# Patient Record
Sex: FEMALE | Race: WHITE | NOT HISPANIC OR LATINO | Employment: UNEMPLOYED | URBAN - METROPOLITAN AREA
[De-identification: names, ages, dates, MRNs, and addresses within clinical notes are randomized per-mention and may not be internally consistent; named-entity substitution may affect disease eponyms.]

---

## 2023-02-05 ENCOUNTER — HOSPITAL ENCOUNTER (EMERGENCY)
Facility: HOSPITAL | Age: 23
Discharge: HOME | End: 2023-02-05
Attending: FAMILY MEDICINE

## 2023-02-05 VITALS
OXYGEN SATURATION: 100 % | BODY MASS INDEX: 27.88 KG/M2 | WEIGHT: 147.69 LBS | TEMPERATURE: 98 F | SYSTOLIC BLOOD PRESSURE: 116 MMHG | HEART RATE: 83 BPM | DIASTOLIC BLOOD PRESSURE: 59 MMHG | RESPIRATION RATE: 19 BRPM | HEIGHT: 61 IN

## 2023-02-05 DIAGNOSIS — R55 SYNCOPE, UNSPECIFIED SYNCOPE TYPE: ICD-10-CM

## 2023-02-05 DIAGNOSIS — F10.920: Primary | ICD-10-CM

## 2023-02-05 LAB
B-HCG UR QL: NEGATIVE
CTP QC/QA: YES

## 2023-02-05 PROCEDURE — 25000003 PHARM REV CODE 250: Performed by: FAMILY MEDICINE

## 2023-02-05 PROCEDURE — 81025 URINE PREGNANCY TEST: CPT | Performed by: FAMILY MEDICINE

## 2023-02-05 PROCEDURE — 96360 HYDRATION IV INFUSION INIT: CPT

## 2023-02-05 PROCEDURE — 99284 EMERGENCY DEPT VISIT MOD MDM: CPT | Mod: 25

## 2023-02-05 RX ADMIN — SODIUM CHLORIDE 1000 ML: 9 INJECTION, SOLUTION INTRAVENOUS at 02:02

## 2023-02-05 NOTE — ED PROVIDER NOTES
Encounter Date: 2/5/2023       History     Chief Complaint   Patient presents with    Alcohol Intoxication     Pt had a syncopal event after drinking alcohol at a party. Pt gcs 15, she states she feels dizzy. Boyfriend at bedside. 250cc NS given by RICCARDO      Pt presents with syncopal episode. Patient reports passing out after drinking alcohol at a party this evening.  Denies hitting her head.   Patient reports she was at a party when incident happened.  Patient reports she woke up and felt dizzy and started having some shortness of breath therefore called EMS herself to bring her to the ED for further evaluation.  Patient denies chest pain, headache, fever, chills, weakness, dizziness, abdominal pain, nausea vomiting diarrhea.    The history is provided by the patient. No  was used.   Review of patient's allergies indicates:  No Known Allergies  History reviewed. No pertinent past medical history.  History reviewed. No pertinent surgical history.  History reviewed. No pertinent family history.  Social History     Tobacco Use    Smoking status: Every Day     Types: Vaping with nicotine    Smokeless tobacco: Never   Substance Use Topics    Alcohol use: Yes    Drug use: Never     Review of Systems   Constitutional:  Negative for diaphoresis, fatigue and fever.   HENT:  Negative for sore throat.    Respiratory:  Positive for shortness of breath. Negative for wheezing and stridor.    Cardiovascular:  Negative for chest pain, palpitations and leg swelling.   Gastrointestinal:  Negative for nausea.   Genitourinary:  Negative for dysuria.   Musculoskeletal:  Negative for back pain.   Skin:  Negative for rash.   Neurological:  Positive for syncope. Negative for weakness.     Physical Exam     Initial Vitals [02/05/23 0128]   BP Pulse Resp Temp SpO2   (!) 137/91 (!) 130 17 98.1 °F (36.7 °C) 100 %      MAP       --         Physical Exam    Nursing note and vitals reviewed.  Constitutional: She appears  well-developed and well-nourished. No distress.   HENT:   Head: Normocephalic and atraumatic.   Eyes: Conjunctivae are normal.   Cardiovascular:  Normal heart sounds and intact distal pulses.           Pulmonary/Chest: Breath sounds normal.   Abdominal: Abdomen is soft. Bowel sounds are normal. There is no abdominal tenderness. There is no rebound and no guarding.   Musculoskeletal:         General: No tenderness or edema. Normal range of motion.     Neurological: She is alert and oriented to person, place, and time. Gait normal. GCS score is 15. GCS eye subscore is 4. GCS verbal subscore is 5. GCS motor subscore is 6.   Skin: Skin is warm and dry. Capillary refill takes less than 2 seconds.   Psychiatric: She has a normal mood and affect. Her behavior is normal. Judgment and thought content normal.       ED Course   Procedures  Labs Reviewed   POCT URINE PREGNANCY          Imaging Results    None          Medications   sodium chloride 0.9% bolus 1,000 mL 1,000 mL (0 mLs Intravenous Stopped 2/5/23 0330)     Medical Decision Making:   Differential Diagnosis:   [place] CT Rule    Exclusion Criteria: Age < 17 y/o; Use of Blood Thinners; Seizure After Injury    GCS < 15 at 2 hours post injury- NO   Suspected open or depress skull Fx-- NO  Signs of Basilar Skull Fx- - NO  > 2 episodes of vomiting- - NO  Age > 65 y/o-- NO  Retrograde amnesia to the event > 30 minutes- - NO  Dangerous mechanism of injury (Fall > 3 feet, Pedestrian-MVC > 5 mph; Ejection)- - NO      Any Criteria Present        No Criteria present, CT is NOT Recommended          The patient is resting comfortably and is in no acute distress.  Pt initially tachycardic however resolving.  Pt declines lab work and further imaging. Pt is clinically sober. Normal gait observed by myself and Doug Ramirez.  Parents at bedside. Patti Glover states that symptoms have improved after treatment within ER. Discussed test results, shared treatment plan, specific  conditions for return, and importance of follow up with patient and family.  The patient understands and agrees with the plan as discussed. Answered all patient questions at this time.  Patti Glover has remained hemodynamically stable throughout the ED course and is appropriate for discharge home.                           Clinical Impression:   Final diagnoses:  [F10.920] Acute alcohol abuse, uncomplicated (Primary)  [R55] Syncope, unspecified syncope type        ED Disposition Condition    Discharge Stable          ED Prescriptions    None       Follow-up Information       Follow up With Specialties Details Why Contact Info    [facility] Emergency Medicine  As needed, If symptoms worsen [address] [facility] [id number] [phone]             Teresa Patel MD  02/07/23 2168